# Patient Record
Sex: MALE | Race: WHITE | NOT HISPANIC OR LATINO | URBAN - METROPOLITAN AREA
[De-identification: names, ages, dates, MRNs, and addresses within clinical notes are randomized per-mention and may not be internally consistent; named-entity substitution may affect disease eponyms.]

---

## 2022-07-11 ENCOUNTER — MEDICAL CORRESPONDENCE (OUTPATIENT)
Dept: HEALTH INFORMATION MANAGEMENT | Facility: CLINIC | Age: 47
End: 2022-07-11

## 2022-07-13 ENCOUNTER — TELEPHONE (OUTPATIENT)
Dept: ORTHOPEDICS | Facility: CLINIC | Age: 47
End: 2022-07-13

## 2022-07-13 ENCOUNTER — TRANSCRIBE ORDERS (OUTPATIENT)
Dept: OTHER | Age: 47
End: 2022-07-13

## 2022-07-13 DIAGNOSIS — R22.32 MASS OF LEFT FINGER: Primary | ICD-10-CM

## 2022-07-13 DIAGNOSIS — M79.9 SOFT TISSUE DISORDER: ICD-10-CM

## 2022-07-13 NOTE — TELEPHONE ENCOUNTER
Health Call Center    Phone Message    May a detailed message be left on voicemail: yes     Reason for Call: Appointment Intake    Referring Provider Name: Orthopedic   Diagnosis and/or Symptoms: Left finger mass, Soft tissue disorder.   Right index and mid fingers with lacerations.     Referral from Dr. Kely Baez from [facility].  Pt to be seen by orthopedics for evaluation.  MRI, X-rays kameron @ [facility].  Pt can be reached at ph# [phone]           Action Taken: Message routed to:  [facility] ([facility]): [facility]MP:  Orthopedics     Travel Screening: Not Applicable

## 2022-07-14 NOTE — TELEPHONE ENCOUNTER
ATC called and spoke to pt. Pt declined first available appt. Pt confirmed appt date/time/location.     Sonja Carmona ATC

## 2022-07-14 NOTE — TELEPHONE ENCOUNTER
7/15 9:30am [place]   7/19 30 min slot clohisy virtual   7/20 8:15am Ajit       Sonja Carmona ATC

## 2022-07-15 NOTE — TELEPHONE ENCOUNTER
Action July 14, 2022 9:10 PM MT   Action Taken CSS sent a request to [facility] Fax #: [phone] for images.   6:33 PM  CSS recvd and resolved imgs to PACS.       DIAGNOSIS: Left finger mass, Soft tissue disorder.   Right index and mid fingers with lacerations.    APPOINTMENT DATE: 07/18/2022   NOTES STATUS DETAILS   OFFICE NOTE from referring provider Ortho /Care Everywhere 06/22/2022, 11/29/2021 - Kely Baez PA-C - [facility]   OFFICE NOTE from other specialist Care Everywhere 09/11/2020 - Chu Abreu MD - [facility]   DISCHARGE REPORT from the ER Care Everywhere 07/11/2022 - Laceration of blood vessel of right index finger - [facility] ED   MEDICATION LIST Care Everywhere    LABS     CBC/DIFF Care Everywhere 03/02/2022   MRI PACS 06/24/2022 - [facility] Hand MRI   XRAYS (IMAGES & REPORTS) PACS 06/22/2022 - [facility] Hand  11/29/2021 - [facility] Hand  11/05/2019 - LT Elbow

## 2022-07-18 ENCOUNTER — PRE VISIT (OUTPATIENT)
Dept: ORTHOPEDICS | Facility: CLINIC | Age: 47
End: 2022-07-18

## 2022-07-20 ENCOUNTER — TELEPHONE (OUTPATIENT)
Dept: ORTHOPEDICS | Facility: CLINIC | Age: 47
End: 2022-07-20

## 2022-07-20 NOTE — TELEPHONE ENCOUNTER
ATC LVM attempting to reschedule appt. Provided call center number and encouraged pt call to get rescheduled.     Sonja Carmona ATC

## 2022-07-28 ENCOUNTER — TELEPHONE (OUTPATIENT)
Dept: ORTHOPEDICS | Facility: CLINIC | Age: 47
End: 2022-07-28

## 2022-07-28 NOTE — TELEPHONE ENCOUNTER
ATC called and spoke to pt. Pt stated he's being seen closer to home and doesn't need appt. ATC confirmed understanding.     Sonja Carmona ATC